# Patient Record
Sex: MALE | Race: OTHER | HISPANIC OR LATINO | URBAN - METROPOLITAN AREA
[De-identification: names, ages, dates, MRNs, and addresses within clinical notes are randomized per-mention and may not be internally consistent; named-entity substitution may affect disease eponyms.]

---

## 2019-12-06 ENCOUNTER — OFFICE (OUTPATIENT)
Dept: URBAN - METROPOLITAN AREA CLINIC 78 | Facility: CLINIC | Age: 34
End: 2019-12-06

## 2019-12-06 VITALS
DIASTOLIC BLOOD PRESSURE: 68 MMHG | TEMPERATURE: 98.1 F | HEIGHT: 66 IN | SYSTOLIC BLOOD PRESSURE: 138 MMHG | HEART RATE: 63 BPM | WEIGHT: 220 LBS

## 2019-12-06 DIAGNOSIS — Z87.891 PERSONAL HISTORY OF NICOTINE DEPENDENCE: ICD-10-CM

## 2019-12-06 DIAGNOSIS — E66.9 OBESITY, UNSPECIFIED: ICD-10-CM

## 2019-12-06 DIAGNOSIS — R07.89 OTHER CHEST PAIN: ICD-10-CM

## 2019-12-06 DIAGNOSIS — R12 HEARTBURN: ICD-10-CM

## 2019-12-06 DIAGNOSIS — E78.5 HYPERLIPIDEMIA, UNSPECIFIED: ICD-10-CM

## 2019-12-06 PROCEDURE — 99244 OFF/OP CNSLTJ NEW/EST MOD 40: CPT

## 2019-12-06 NOTE — SERVICEHPINOTES
KYARA LALA   is a   34   year old white  male who is being seen in consultation at the request of   GINNY DE PAZ   for heartburn and chest pain that began about 4 to 6 months ago. He recalls severe episodes of heartburn manifesting during day and nighttime along with regurgitation and mid sternal chest "burning" sensation.  Reviewed most recent PCP office visit note from 11/22/19 stating patient has been to the emergency department four times in the past year due to chest pain and he had a negative cardiac workup 2 months ago in 09/2019. He mentions recent consult with cardiologist Dr De Paz with upcoming echocardiogram and stress test scheduled for next week. He was also advised by PCP to start Prilosec x 1 week ago that has been used as instructed every morning 30 min prior to meal that has provided improvement to his chest pain and reflux. Former h/o heavy smoker x 20 years on 1.5 packs per day (He quit smoking x 1 yr ago). Rare NSAID use. No fm h/o digestive disorders or malignancies. No known pulmonary disease. Denies cough, n/v, dysphagia, abdominal pain, early satiety, change in bowel habits, weight loss. No other complaints.

## 2020-01-03 ENCOUNTER — OFFICE (OUTPATIENT)
Dept: URBAN - METROPOLITAN AREA PATHOLOGY 17 | Facility: PATHOLOGY | Age: 35
End: 2020-01-03

## 2020-01-03 ENCOUNTER — OFFICE (OUTPATIENT)
Dept: URBAN - METROPOLITAN AREA CLINIC 32 | Facility: CLINIC | Age: 35
End: 2020-01-03

## 2020-01-03 VITALS
SYSTOLIC BLOOD PRESSURE: 133 MMHG | RESPIRATION RATE: 13 BRPM | SYSTOLIC BLOOD PRESSURE: 126 MMHG | DIASTOLIC BLOOD PRESSURE: 76 MMHG | OXYGEN SATURATION: 97 % | OXYGEN SATURATION: 92 % | HEIGHT: 66 IN | RESPIRATION RATE: 16 BRPM | OXYGEN SATURATION: 100 % | SYSTOLIC BLOOD PRESSURE: 122 MMHG | DIASTOLIC BLOOD PRESSURE: 75 MMHG | DIASTOLIC BLOOD PRESSURE: 77 MMHG | RESPIRATION RATE: 12 BRPM | SYSTOLIC BLOOD PRESSURE: 114 MMHG | SYSTOLIC BLOOD PRESSURE: 107 MMHG | HEART RATE: 70 BPM | TEMPERATURE: 98.8 F | TEMPERATURE: 99.3 F | HEART RATE: 97 BPM | WEIGHT: 220 LBS | HEART RATE: 116 BPM | DIASTOLIC BLOOD PRESSURE: 91 MMHG | OXYGEN SATURATION: 96 % | HEART RATE: 98 BPM | HEART RATE: 78 BPM | DIASTOLIC BLOOD PRESSURE: 61 MMHG

## 2020-01-03 DIAGNOSIS — R12 HEARTBURN: ICD-10-CM

## 2020-01-03 DIAGNOSIS — K31.89 OTHER DISEASES OF STOMACH AND DUODENUM: ICD-10-CM

## 2020-01-03 DIAGNOSIS — R07.89 OTHER CHEST PAIN: ICD-10-CM

## 2020-01-03 DIAGNOSIS — K29.60 OTHER GASTRITIS WITHOUT BLEEDING: ICD-10-CM

## 2020-01-03 DIAGNOSIS — K20.8 OTHER ESOPHAGITIS: ICD-10-CM

## 2020-01-03 PROBLEM — K20.9 ESOPHAGITIS, UNSPECIFIED: Status: ACTIVE | Noted: 2020-01-03

## 2020-01-03 PROCEDURE — 88313 SPECIAL STAINS GROUP 2: CPT

## 2020-01-03 PROCEDURE — 88312 SPECIAL STAINS GROUP 1: CPT

## 2020-01-03 PROCEDURE — 43239 EGD BIOPSY SINGLE/MULTIPLE: CPT

## 2020-01-03 PROCEDURE — 88305 TISSUE EXAM BY PATHOLOGIST: CPT

## 2020-01-03 PROCEDURE — 88342 IMHCHEM/IMCYTCHM 1ST ANTB: CPT

## 2021-06-15 ENCOUNTER — OFFICE (OUTPATIENT)
Dept: URBAN - METROPOLITAN AREA CLINIC 79 | Facility: CLINIC | Age: 36
End: 2021-06-15
Payer: COMMERCIAL

## 2021-06-15 VITALS
WEIGHT: 229 LBS | DIASTOLIC BLOOD PRESSURE: 80 MMHG | HEART RATE: 83 BPM | TEMPERATURE: 96 F | SYSTOLIC BLOOD PRESSURE: 107 MMHG | HEIGHT: 66 IN

## 2021-06-15 DIAGNOSIS — K44.9 DIAPHRAGMATIC HERNIA WITHOUT OBSTRUCTION OR GANGRENE: ICD-10-CM

## 2021-06-15 DIAGNOSIS — Z87.891 PERSONAL HISTORY OF NICOTINE DEPENDENCE: ICD-10-CM

## 2021-06-15 DIAGNOSIS — K21.00 GASTRO-ESOPHAGEAL REFLUX DISEASE WITH ESOPHAGITIS, WITHOUT B: ICD-10-CM

## 2021-06-15 DIAGNOSIS — R12 HEARTBURN: ICD-10-CM

## 2021-06-15 PROCEDURE — 99214 OFFICE O/P EST MOD 30 MIN: CPT | Performed by: PHYSICIAN ASSISTANT

## 2021-06-15 RX ORDER — PANTOPRAZOLE SODIUM 40 MG/1
40 TABLET, DELAYED RELEASE ORAL
Qty: 90 | Refills: 3 | Status: ACTIVE

## 2021-06-15 NOTE — SERVICEHPINOTES
KYARA LALA   is a   34 yo white male who complains of "hiatal hernia" concern. He was last seen by our office in 12/2019 for chest pain evaluation that led to cardiac and GI evaluation at that time. His 01/2020 EGD found evidence of small sized hiatal hernia, mild non erosive gastritis (bx neg H. pylori), grade I esophagitis (bx benign/ neg IM) and mild duodenitis (bx neg celiac disease). He has been taking rx Pantoprazole 20 mg qd used as directed 30 min prior to breakfast since 01/2020. He states if he goes two days without the PPI that the "chest pain" comes back with severe heartburn. On the Pantoprazole 40 mg qd then the symptoms are well controlled. He has cut back on spicy meals, citrus fruits, pizza/pasta sauce. He eats dinner by 6-7 pm: No late night snacks. Former h/o heavy smoker x 20 years on 1.5 packs per day (He quit smoking x 2 yr ago). Rare NSAID use. No fm h/o digestive disorders or malignancies. No known pulmonary disease. Denies chest pain, dyspnea, cough, n/v, dysphagia, abdominal pain, early satiety, change in bowel habits, weight loss. No other complaints